# Patient Record
Sex: MALE | Race: WHITE | Employment: FULL TIME | URBAN - METROPOLITAN AREA
[De-identification: names, ages, dates, MRNs, and addresses within clinical notes are randomized per-mention and may not be internally consistent; named-entity substitution may affect disease eponyms.]

---

## 2021-11-28 ENCOUNTER — HOSPITAL ENCOUNTER (EMERGENCY)
Age: 31
Discharge: HOME | End: 2021-11-28
Payer: COMMERCIAL

## 2021-11-28 VITALS
OXYGEN SATURATION: 95 % | BODY MASS INDEX: 31.87 KG/M2 | RESPIRATION RATE: 16 BRPM | SYSTOLIC BLOOD PRESSURE: 104 MMHG | WEIGHT: 255 LBS | DIASTOLIC BLOOD PRESSURE: 68 MMHG | HEART RATE: 92 BPM | TEMPERATURE: 97.5 F

## 2021-11-28 DIAGNOSIS — J06.9 UPPER RESPIRATORY TRACT INFECTION, UNSPECIFIED TYPE: Primary | ICD-10-CM

## 2021-11-28 PROCEDURE — 99211 OFF/OP EST MAY X REQ PHY/QHP: CPT

## 2021-11-28 PROCEDURE — U0003 INFECTIOUS AGENT DETECTION BY NUCLEIC ACID (DNA OR RNA); SEVERE ACUTE RESPIRATORY SYNDROME CORONAVIRUS 2 (SARS-COV-2) (CORONAVIRUS DISEASE [COVID-19]), AMPLIFIED PROBE TECHNIQUE, MAKING USE OF HIGH THROUGHPUT TECHNOLOGIES AS DESCRIBED BY CMS-2020-01-R: HCPCS

## 2021-11-28 PROCEDURE — U0005 INFEC AGEN DETEC AMPLI PROBE: HCPCS

## 2021-11-28 RX ORDER — BROMPHENIRAMINE MALEATE, PSEUDOEPHEDRINE HYDROCHLORIDE, AND DEXTROMETHORPHAN HYDROBROMIDE 2; 30; 10 MG/5ML; MG/5ML; MG/5ML
5 SYRUP ORAL 4 TIMES DAILY PRN
Qty: 140 ML | Refills: 0 | Status: SHIPPED | OUTPATIENT
Start: 2021-11-28 | End: 2021-12-05

## 2021-11-28 NOTE — Clinical Note
Ramona Quintanilla was seen and treated in our emergency department on 11/28/2021. He may return to work on 12/01/2021. If Covid negative     If you have any questions or concerns, please don't hesitate to call.       JOSUÉ Mccoy

## 2021-11-28 NOTE — ED PROVIDER NOTES
Department of Emergency . [facility]  Provider Note  Admit Date/RoomTime: 11/28/2021  8:39 AM  Room: 03/03  Chief Complaint   Cough (chills, achy, since last Saturday, some sinus drainage)    History of Present Illness   Source of history provided by:  Patient. History/Exam Limitations: None. Kathya Brock is a 33 y.o. male with no significant medical history. Patient presents with a 1 week history of myalgias, arthralgias,, congestion, and nonproductive cough. No shortness of breath. No fever. Denies any chest pain. Hypogeusia over the last few days. Denies any hemoptysis. No vomiting or diarrhea. Is unvaccinated. No Covid exposures unless stated above. No history of underlying cardiopulmonary disease unless stated above. ROS    Pertinent positives and negatives are stated within HPI, all other systems reviewed and are negative. Past Surgical History:   Procedure Laterality Date    BICEPS TENDON REPAIR Left 05/27/2016    DISTAL    ELBOW FRACTURE SURGERY Right 1998? Social History:  reports that he has never smoked. He has never used smokeless tobacco. He reports that he does not drink alcohol and does not use drugs. Family History: family history is not on file. Allergies: Amoxicillin    Physical Exam            ED Triage Vitals [11/28/21 0840]   BP Temp Temp src Pulse Resp SpO2 Height Weight   104/68 97.5 °F (36.4 °C) -- 92 16 95 % -- 255 lb (115.7 kg)      Oxygen Saturation Interpretation: Normal.    Gen.: Vitals noted no distress. Afebrile. Normal phonation, no stridor, no trismus. Not hypoxemic. Clinically well-appearing. ENT: Appears well-hydrated. Posterior oropharynx is unremarkable. Neck: Supple. No meningismus through full range of motion. No anterior cervical or submandibular lymphadenopathy. No posterior lymphadenopathy. Cardiac: Regular rate rhythm no murmur. Lungs: Good aeration throughout. No adventitious breath sounds. Abdomen: Soft, nontender, nonsurgical throughout. Normoactive bowel sounds. Extremities: No peripheral edema, negative Homans bilaterally no cords. Skin: No rash. Neuro: No gross neurologic deficits. Lab / Imaging Results   (All laboratory and radiology results have been personally reviewed by myself)  Labs:  No results found for this visit on 11/28/21. Imaging: All Radiology results interpreted by Radiologist unless otherwise noted. No orders to display     ED Course / Medical Decision Making   Medications - No data to display       Differential Diagnosis: Is extensive but includes viral URI, COVID-19, community-acquired pneumonia, pulmonary embolism, hypoxemia, impending respiratory failure, etc.    MDM:     This is a 33 y.o. male who presents with the above history. The patient has an unremarkable physical exam. Send out Covid was obtained and pending. Likely viral URI. Home-going with the below medications. The patient patient is clinically very well-appearing and nontoxic with no evidence of acute cardiopulmonary compromise. We discussed quarantine, risk mitigation strategies, and return to school/work restrictions if applicable. Plan of Care: Normal progression of disease discussed. All questions answered. Explained symptomatic treatment. Instruction provided in the use of fluids, vaporizer, acetaminophen, and other OTC medication for symptom control. Extra fluids  Analgesics as needed, dose reviewed. Follow up as needed should symptoms fail to improve. Counseling: Homegoing. I discussed the differential, results and discharge plan with the patient and/or family/friend/caregiver if present. I emphasized the importance of follow-up with the physician I referred them to in the timeframe recommended. I explained reasons for the patient to return to the Emergency Department.  Additional verbal discharge instructions were also given and discussed with the patient to supplement those generated by the EMR. We also discussed medications that were prescribed (if any) including common side effects and interactions. The patient was advised to abstain from driving, operating heavy machinery or making significant decisions while taking medications such as opiates and muscle relaxers that may impair this. All questions were addressed. They understand return precautions and discharge instructions. The patient and/or family/friend/caregiver expressed understanding. Assessment      1. Upper respiratory tract infection, unspecified type      Plan   Discharge to home and advised to contact Nenita Rivas MD  [address] [facility]  [phone]    In 2 days     Patient condition is good    New Medications     Discharge Medication List as of 11/28/2021  8:54 AM      START taking these medications    Details   brompheniramine-pseudoephedrine-DM 2-30-10 MG/5ML syrup Take 5 mLs by mouth 4 times daily as needed for Congestion or Cough, Disp-140 mL, R-0Print           Electronically signed by JOSUÉ Francisco   DD: 11/28/21  **This report was transcribed using voice recognition software. Every effort was made to ensure accuracy; however, inadvertent computerized transcription errors may be present.   END OF ED PROVIDER NOTE        Shaylee Cornea [address], [address]  11/28/21 [facility], [address]  11/28/21 [id number]

## 2021-11-29 ENCOUNTER — CARE COORDINATION (OUTPATIENT)
Dept: CASE MANAGEMENT | Age: 31
End: 2021-11-29

## 2021-11-29 LAB — SARS-COV-2, PCR: DETECTED

## 2021-11-29 NOTE — CARE COORDINATION
[employer] HIPAA compliant message for patient to return call re: ED Follow-up for Covid test positive     Roel Hargrove, [address] [facility] Coordination Transition

## 2023-05-24 ENCOUNTER — HOSPITAL ENCOUNTER (OUTPATIENT)
Dept: GENERAL RADIOLOGY | Age: 33
Discharge: HOME | End: 2023-05-26
Payer: COMMERCIAL

## 2023-05-24 ENCOUNTER — HOSPITAL ENCOUNTER (OUTPATIENT)
Age: 33
Discharge: HOME | End: 2023-05-26
Payer: COMMERCIAL

## 2023-05-24 DIAGNOSIS — R06.02 SHORTNESS OF BREATH: ICD-10-CM

## 2023-05-24 PROCEDURE — 71046 X-RAY EXAM CHEST 2 VIEWS: CPT
